# Patient Record
Sex: MALE | Race: WHITE | Employment: FULL TIME | URBAN - METROPOLITAN AREA
[De-identification: names, ages, dates, MRNs, and addresses within clinical notes are randomized per-mention and may not be internally consistent; named-entity substitution may affect disease eponyms.]

---

## 2018-03-12 ENCOUNTER — OFFICE VISIT (OUTPATIENT)
Dept: FAMILY MEDICINE CLINIC | Age: 33
End: 2018-03-12
Payer: COMMERCIAL

## 2018-03-12 ENCOUNTER — HOSPITAL ENCOUNTER (OUTPATIENT)
Dept: GENERAL RADIOLOGY | Age: 33
Discharge: HOME | End: 2018-03-14
Payer: COMMERCIAL

## 2018-03-12 ENCOUNTER — HOSPITAL ENCOUNTER (OUTPATIENT)
Age: 33
Discharge: HOME | End: 2018-03-14
Payer: COMMERCIAL

## 2018-03-12 VITALS
HEIGHT: 70 IN | SYSTOLIC BLOOD PRESSURE: 110 MMHG | HEART RATE: 74 BPM | BODY MASS INDEX: 23.77 KG/M2 | RESPIRATION RATE: 16 BRPM | DIASTOLIC BLOOD PRESSURE: 63 MMHG | WEIGHT: 166 LBS

## 2018-03-12 DIAGNOSIS — M79.672 LEFT FOOT PAIN: Primary | ICD-10-CM

## 2018-03-12 DIAGNOSIS — M79.672 LEFT FOOT PAIN: ICD-10-CM

## 2018-03-12 DIAGNOSIS — M79.671 RIGHT FOOT PAIN: Primary | ICD-10-CM

## 2018-03-12 PROCEDURE — G8427 DOCREV CUR MEDS BY ELIG CLIN: HCPCS | Performed by: FAMILY MEDICINE

## 2018-03-12 PROCEDURE — 73630 X-RAY EXAM OF FOOT: CPT

## 2018-03-12 PROCEDURE — 1036F TOBACCO NON-USER: CPT | Performed by: FAMILY MEDICINE

## 2018-03-12 PROCEDURE — G8484 FLU IMMUNIZE NO ADMIN: HCPCS | Performed by: FAMILY MEDICINE

## 2018-03-12 PROCEDURE — 99213 OFFICE O/P EST LOW 20 MIN: CPT | Performed by: FAMILY MEDICINE

## 2018-03-12 PROCEDURE — G8420 CALC BMI NORM PARAMETERS: HCPCS | Performed by: FAMILY MEDICINE

## 2018-03-12 RX ORDER — IBUPROFEN 800 MG/1
800 TABLET ORAL EVERY 8 HOURS PRN
Qty: 30 TABLET | Refills: 0 | Status: SHIPPED | OUTPATIENT
Start: 2018-03-12

## 2018-03-12 NOTE — PROGRESS NOTES
Subjective:  Neck is in for a sick call. He is complaining of foot pain. It is located in his left foot on the lateral aspect. It has been occurring for approximately 2 days. He has treated it with ibuprofen and had limited relief. He denies any injury to the area. He denies any constitutional symptoms such as fever, chills, sweats, or weight loss. Objective:  /63   Pulse 74   Resp 16   Ht 5' 10\" (1.778 m)   Wt 166 lb (75.3 kg)   BMI 23.82 kg/m²   Musculoskeletal: The exam is limited to the right foot. The fifth MTP joint is swollen and tender. There is no overlying erythema nor warmth. He has a full range of motion. Assessment:  1. right foot pain  ibuprofen (ADVIL;MOTRIN) 800 MG tablet    XR FOOT RIGHT (MIN 3 VIEWS)    CANCELED: XR FOOT LEFT (MIN 3 VIEWS)       Plan:  Medications, laboratory testing, imaging, consultation, and follow up as documented in this encounter. Further evaluation and management will be dependent upon x-ray results and response to medication.

## 2018-03-16 ENCOUNTER — HOSPITAL ENCOUNTER (OUTPATIENT)
Age: 33
Discharge: HOME | End: 2018-03-16
Payer: COMMERCIAL

## 2018-03-16 LAB — URIC ACID: 5.8 MG/DL (ref 3.4–7)

## 2018-03-16 PROCEDURE — 36415 COLL VENOUS BLD VENIPUNCTURE: CPT

## 2018-03-16 PROCEDURE — 84550 ASSAY OF BLOOD/URIC ACID: CPT

## 2018-11-12 ENCOUNTER — HOSPITAL ENCOUNTER (OUTPATIENT)
Age: 33
Discharge: HOME | End: 2018-11-14
Payer: COMMERCIAL

## 2018-11-12 ENCOUNTER — HOSPITAL ENCOUNTER (OUTPATIENT)
Dept: GENERAL RADIOLOGY | Age: 33
Discharge: HOME | End: 2018-11-14
Payer: COMMERCIAL

## 2018-11-12 ENCOUNTER — OFFICE VISIT (OUTPATIENT)
Dept: PRIMARY CARE CLINIC | Age: 33
End: 2018-11-12
Payer: COMMERCIAL

## 2018-11-12 VITALS
HEART RATE: 63 BPM | RESPIRATION RATE: 16 BRPM | DIASTOLIC BLOOD PRESSURE: 66 MMHG | OXYGEN SATURATION: 99 % | WEIGHT: 174.8 LBS | SYSTOLIC BLOOD PRESSURE: 118 MMHG | BODY MASS INDEX: 25.08 KG/M2

## 2018-11-12 DIAGNOSIS — S69.91XA INJURY OF RIGHT HAND, INITIAL ENCOUNTER: Primary | ICD-10-CM

## 2018-11-12 DIAGNOSIS — S69.91XA INJURY OF RIGHT HAND, INITIAL ENCOUNTER: ICD-10-CM

## 2018-11-12 PROCEDURE — 1036F TOBACCO NON-USER: CPT | Performed by: FAMILY MEDICINE

## 2018-11-12 PROCEDURE — 73130 X-RAY EXAM OF HAND: CPT

## 2018-11-12 PROCEDURE — G8484 FLU IMMUNIZE NO ADMIN: HCPCS | Performed by: FAMILY MEDICINE

## 2018-11-12 PROCEDURE — 99213 OFFICE O/P EST LOW 20 MIN: CPT | Performed by: FAMILY MEDICINE

## 2018-11-12 PROCEDURE — G8427 DOCREV CUR MEDS BY ELIG CLIN: HCPCS | Performed by: FAMILY MEDICINE

## 2018-11-12 PROCEDURE — G8419 CALC BMI OUT NRM PARAM NOF/U: HCPCS | Performed by: FAMILY MEDICINE

## 2018-11-12 ASSESSMENT — PATIENT HEALTH QUESTIONNAIRE - PHQ9
2. FEELING DOWN, DEPRESSED OR HOPELESS: 0
SUM OF ALL RESPONSES TO PHQ QUESTIONS 1-9: 0
SUM OF ALL RESPONSES TO PHQ9 QUESTIONS 1 & 2: 0
SUM OF ALL RESPONSES TO PHQ QUESTIONS 1-9: 0
1. LITTLE INTEREST OR PLEASURE IN DOING THINGS: 0

## 2018-11-12 NOTE — PATIENT INSTRUCTIONS
Patient Education        Hand Pain: Care Instructions  Your Care Instructions    Common causes of hand pain are overuse and injuries, such as might happen during sports or home repair projects. Everyday wear and tear, especially as you get older, also can cause hand pain. Most minor hand injuries will heal on their own, and home treatment is usually all you need to do. If you have sudden and severe pain, you may need tests and treatment. Follow-up care is a key part of your treatment and safety. Be sure to make and go to all appointments, and call your doctor if you are having problems. It's also a good idea to know your test results and keep a list of the medicines you take. How can you care for yourself at home? · Take pain medicines exactly as directed. ? If the doctor gave you a prescription medicine for pain, take it as prescribed. ? If you are not taking a prescription pain medicine, ask your doctor if you can take an over-the-counter medicine. · Rest and protect your hand. Take a break from any activity that may cause pain. · Put ice or a cold pack on your hand for 10 to 20 minutes at a time. Put a thin cloth between the ice and your skin. · Prop up the sore hand on a pillow when you ice it or anytime you sit or lie down during the next 3 days. Try to keep it above the level of your heart. This will help reduce swelling. · If your doctor recommends a sling, splint, or elastic bandage to support your hand, wear it as directed. When should you call for help? Call 911 anytime you think you may need emergency care. For example, call if:    · Your hand turns cool or pale or changes color.    Call your doctor now or seek immediate medical care if:    · You cannot move your hand.     · Your hand pops, moves out of its normal position, and then returns to its normal position.     · You have signs of infection, such as:  ? Increased pain, swelling, warmth, or redness.   ? Red streaks leading from the sore

## 2018-11-15 ENCOUNTER — HOSPITAL ENCOUNTER (OUTPATIENT)
Dept: OCCUPATIONAL THERAPY | Facility: CLINIC | Age: 33
Setting detail: THERAPIES SERIES
Discharge: HOME | End: 2018-11-15
Payer: COMMERCIAL

## 2018-11-15 PROCEDURE — 97165 OT EVAL LOW COMPLEX 30 MIN: CPT

## 2018-11-15 PROCEDURE — 97110 THERAPEUTIC EXERCISES: CPT

## 2018-11-15 NOTE — CONSULTS
[] [address] [facility] floor       [address], [place]         Phone: [phone]       Fax: [phone] [x] 6134 [facility] at [address] , [address]  Phone: [phone]  Fax: [phone]       Occupational Therapy Hand & Upper Extremity  Initial Evaluation      Date: 11/15/2018      Patient: Linda Croft  : 1985  MRN: [id number]    Physician: Salvador Magallon MD    Insurance: MMO   40 visits max/40 available     Medical Diagnosis: Injury of the right hand S69.91XA. Rehab Codes: Edema, localized R60.0; pain in right finger M79.644; muscle atrophy, right hand M62.541. Onset Date: 18    Next Dr. Manny Dumas: 19  1:00 PM  #Visits/Total Visits:     2 times a week for 8 visits      Cancels/No Shows:  0/0    Past Medical History: [ X ] Unremarkable  [  ] MI/Heart Problems  [  ] Refer to full medical chart in EPIC  [  ] Diabetes  [  ] Martene Crown  [  ] HTN  [  ] Arthritis  [  ] Pacemaker  [  ] Other:  Medications:  [  ] Refer to full medical chart in EPIC  [  ] None  [ X ] Other: Advil prn. Allergies:  [  ] Refer to full medical chart in EPIC  [  ] None  [ X ] Other: Liquid Tylenol, Amocycillin. Mechanism of Injury: Right index vs hand tool  Surgery Date: NA    Precautions:   [x]None [] Fall Risk []WB Status [] Pacemaker []Other:            Involved Extremity:      [] Left [x] Right  Dominant: [] Left [x]Right  Previous Level of Function: Globally independent  Critical Job/Daily Task Description: Self care, household tasks, parenting tasks, typing, computer and phone use.   Work Status: [x] Normal [] Restricted [] Off D/T Injury/Condition [] Retired [] Unemployed [] Disabled []Other:  Orthosis:  NA   [] Currently has [] To be custom fabricated this date []Planned for subsequent visit  Type:      Subjective:  Chief Complaint: Pain   Pain: Intensity:   3/10 Location: Right index   Pain Type: [x] Constant []

## 2018-11-19 ENCOUNTER — HOSPITAL ENCOUNTER (OUTPATIENT)
Dept: OCCUPATIONAL THERAPY | Facility: CLINIC | Age: 33
Setting detail: THERAPIES SERIES
Discharge: HOME | End: 2018-11-19
Payer: COMMERCIAL

## 2018-11-19 PROCEDURE — 97110 THERAPEUTIC EXERCISES: CPT

## 2018-11-19 PROCEDURE — 97035 APP MDLTY 1+ULTRASOUND EA 15: CPT

## 2018-11-28 ENCOUNTER — HOSPITAL ENCOUNTER (OUTPATIENT)
Dept: OCCUPATIONAL THERAPY | Facility: CLINIC | Age: 33
Setting detail: THERAPIES SERIES
Discharge: HOME | End: 2018-11-28
Payer: COMMERCIAL

## 2018-11-28 PROCEDURE — 97110 THERAPEUTIC EXERCISES: CPT

## 2018-11-28 PROCEDURE — 97035 APP MDLTY 1+ULTRASOUND EA 15: CPT

## 2018-11-28 NOTE — FLOWSHEET NOTE
[] [place]       Occupational Therapy             1st floor       [address] [place], [place]         Phone: [phone]       Fax: [phone] [x] 6135 [facility] at            [address] , [address] [place] [address]     Phone: [phone]     Fax: [phone]      Occupational Therapy Daily Treatment Note    Date:  2018  Patient Name:  Linda Croft    :  1985  MRN: [id number]  Physician: Salvador Magallon MD                   Insurance: MMO   40 visits max/40 available                Medical Diagnosis: Injury of the right hand S69.91XA. Rehab Codes: Edema, localized R60.0; pain in right finger M79.644; muscle atrophy, right hand M62.541.     Onset Date: 18               Next Dr. Manny Dumas: 19  1:00 PM  #Visits/Total Visits:     2 times a week for 8 visits      Cancels/No Shows:  0/0     Subjective:    Pain:  [x] Yes  [] No Location: Right index MCP joint   Pain Rating: (0-10 scale) 0/10 at rest, 2-3/10 with resistive movement  Pain altered Tx:  [x] No  [] Yes  Action: NA  Pt Comments: NA    Objective:  Modalities:   Modality Flow Sheet:  START STOP Tx Modality       Electrical Stim:      11/15/18   Ultrasound: 0.8 W/cm2 x 6-8 mins  Duty factor: __100%  __50%  __33% __20%  Head size: 2 cm  MHz: __1mHz  __3mHz  Location: dorsum of right index MCP joint       Hot Pack:       Cold Pack:                                 Flow Sheet:  Exercise Reps/Time Weight/Level Comments   Tendon glides 17 reps   Increased reps. Ultrasound, see parameters above     Administered   Resistive , two- and three point pinches and ext with Putty  20 reps  Yellow  Completed. No putty issued for HEP due to small children at home.  (Pt will use \"Play Do\" for two point pinch ex. (Lateral pinch ex on hold at this time).    Small Pinch Maze  As tolerated   Increased distance   Resistive  with hand exercisor 27 reps  15 pounds  Completed    Grasp and release with particle bin  corn Added    Resistive pinch with pinch pin and foam cubes 1 handful  2 pounds  Added         Comments/Assessment:  Ex's added with no increase in pain (2-3/10). No edema observed at right index MCP joint this date. Pt reports pain with radial/ulnar resistive movement at the MCP joint. Specific Instructions for Next Treatment:    Treatment Charges: Mins Units   [x]  Modalities:  Ultrasound   8 1   [x]  Ther Exercise 17 1   []  Manual Therapy     []  Ther Activities     []  Orthotic fitting/training     []  Orthotic re-check     []  Other         Assessment: [x] Progressing toward goals. [] No change. [] Other:    Short Term Goals: (   8    Treatments)  1. Decrease Pain:  Pain will be 0/10 at rest and with activity. 2. Increase strength (pounds): Right  strength will be symmetrical with the left. pinches will be symmetrical or greater than the Rt. 3. Increase function:  DASH score will be 2% functionally impaired or less. 4. Decrease Edema: Circumfirential measurements will be symmetrical, Rt vs Lt. 5. Independent with Home Exercise Program in 2 sessions.        Long Term Goals: NA       Pt. Education:  [] Yes  [] No  [x] Reviewed Prior HEP/Ed  Method of Education: [x] Verbal  [] Demo  [] Written  Re:  Comprehension of Education:  [] Verbalizes understanding. [] Demonstrates understanding. [] Needs review. [x] Demonstrates/verbalizes HEP/Ed previously given. Treatment Plan: 2 times a week for 8 visits for pain management and strengthening of right index finger. Time In/Out: 1101 - 1126  Total Treatment Time:  25  Min.       Electronically signed by:  Neo Freedman OTR/JANENE, OTF

## 2018-11-29 ENCOUNTER — HOSPITAL ENCOUNTER (OUTPATIENT)
Dept: OCCUPATIONAL THERAPY | Facility: CLINIC | Age: 33
Setting detail: THERAPIES SERIES
Discharge: HOME | End: 2018-11-29
Payer: COMMERCIAL

## 2018-11-29 PROCEDURE — 97110 THERAPEUTIC EXERCISES: CPT

## 2018-11-29 NOTE — FLOWSHEET NOTE
[] [facility]       Occupational Therapy             1st floor       [address] [place], [place]         Phone: [phone]       Fax: [phone] [x] [address] at            [address] , [address] [place] [address]     Phone: [phone]     Fax: [phone]      Occupational Therapy Daily Treatment Note    Date:  2018  Patient Name:  Amberly Harrington    :  1985  MRN: [id number]  Physician: Concetta Melchor MD                   Insurance: MMO   40 visits max/40 available                Medical Diagnosis: Injury of the right hand S69.91XA. Rehab Codes: Edema, localized R60.0; pain in right finger M79.644; muscle atrophy, right hand M62.541.     Onset Date: 18               Next Dr. Lenin Moe: 19  1:00 PM  #Visits/Total Visits: /8    2 times a week for 8 visits      Cancels/No Shows:  0/0     Subjective:    Pain:  [x] Yes  [] No Location: Right index MCP joint   Pain Rating: (0-10 scale) 0/10 at rest, 2/10 with resistive movement  Pain altered Tx:  [x] No  [] Yes  Action: NA  Pt Comments: NA    Objective:  Modalities:   Modality Flow Sheet:  START STOP Tx Modality       Electrical Stim:      11/15/18   Ultrasound: 0.8 W/cm2 x 6-8 mins  Duty factor: __100%  __50%  __33% __20%  Head size: 2 cm  MHz: __1mHz  __3mHz  Location: dorsum of right index MCP joint       Hot Pack:       Cold Pack:                                 Flow Sheet:  Exercise Reps/Time Weight/Level Comments   Tendon glides 20 reps   Increased reps. Ultrasound, see parameters above     Administered   Resistive , two- and three point pinches and ext with Putty  20 reps  Yellow  Completed. No putty issued for HEP due to small children at home.  (Pt will use \"Play Do\" for two point pinch ex. (Lateral pinch ex on hold at this time).    Small Pinch Maze  As tolerated   Increased distance   Resistive  with hand exercisor 7 reps  15 pounds  Decreased reps d/t pain   Grasp and release

## 2018-12-03 ENCOUNTER — HOSPITAL ENCOUNTER (OUTPATIENT)
Dept: OCCUPATIONAL THERAPY | Facility: CLINIC | Age: 33
Setting detail: THERAPIES SERIES
Discharge: HOME | End: 2018-12-03
Payer: COMMERCIAL

## 2018-12-03 PROCEDURE — 97035 APP MDLTY 1+ULTRASOUND EA 15: CPT

## 2018-12-03 PROCEDURE — 97110 THERAPEUTIC EXERCISES: CPT

## 2018-12-03 NOTE — FLOWSHEET NOTE
Resistive pinch with pinch pin and foam cubes 2 handfuls 2-4 pounds  Increased resistance and reps        Comments/Assessment:  Pain at start of visit 1-2/10. Ex's as outlined above, with no increase in pain with putty ex, reduced to 1/10 at end of visit. Pt reports pain with radial/ulnar resistive movement at the MCP joint, mostly in 2nd web space. Brenden Gomez Specific Instructions for Next Treatment:    Treatment Charges: Mins Units   [x]  Modalities:  Ultrasound   8 1   [x]  Ther Exercise 20 1   []  Manual Therapy     []  Ther Activities     []  Orthotic fitting/training     []  Orthotic re-check     []  Other         Assessment: [x] Progressing toward goals. [] No change. [] Other:    Short Term Goals: (   8    Treatments)  1. Decrease Pain:  Pain will be 0/10 at rest and with activity. 2. Increase strength (pounds): Right  strength will be symmetrical with the left. pinches will be symmetrical or greater than the Rt. 3. Increase function:  DASH score will be 2% functionally impaired or less. 4. Decrease Edema: Circumfirential measurements will be symmetrical, Rt vs Lt. 5. Independent with Home Exercise Program in 2 sessions.        Long Term Goals: NA       Pt. Education:  [] Yes  [] No  [x] Reviewed Prior HEP/Ed  Method of Education: [x] Verbal  [] Demo  [] Written  Re:  Comprehension of Education:  [] Verbalizes understanding. [] Demonstrates understanding. [] Needs review. [x] Demonstrates/verbalizes HEP/Ed previously given. Treatment Plan: 2 times a week for 8 visits for pain management and strengthening of right index finger. Time In/Out: 9478 - 5187  Total Treatment Time:  28  Min.       Electronically signed by:  Neo Freedman OTR/L, CHARELI

## 2018-12-05 ENCOUNTER — HOSPITAL ENCOUNTER (OUTPATIENT)
Dept: OCCUPATIONAL THERAPY | Facility: CLINIC | Age: 33
Setting detail: THERAPIES SERIES
Discharge: HOME | End: 2018-12-05
Payer: COMMERCIAL

## 2018-12-05 PROCEDURE — 97110 THERAPEUTIC EXERCISES: CPT

## 2018-12-13 ENCOUNTER — HOSPITAL ENCOUNTER (OUTPATIENT)
Dept: OCCUPATIONAL THERAPY | Facility: CLINIC | Age: 33
Setting detail: THERAPIES SERIES
Discharge: HOME | End: 2018-12-13
Payer: COMMERCIAL

## 2018-12-13 PROCEDURE — 97110 THERAPEUTIC EXERCISES: CPT

## 2018-12-13 PROCEDURE — 97035 APP MDLTY 1+ULTRASOUND EA 15: CPT

## 2018-12-13 NOTE — PROGRESS NOTES
[] [address] [facility] floor       [address], [place]         Phone: [phone]       Fax: [phone] [x] 6135 [facility] at [facility] [address] [place] [address] , [address]  Phone: [phone]  Fax: [phone]       Occupational Therapy Hand & Upper Extremity  Progress Note      Date: 2018      Patient: Tosha Coreas  : 1985  MRN: [id number]    Physician: Marce Enriquez MD    Insurance: MMO   40 visits max/40 available     Medical Diagnosis: Injury of the right hand S69.91XA. Rehab Codes: Edema, localized R60.0; pain in right finger M79.644; muscle atrophy, right hand M62.541. Onset Date: 18    Next Dr. Benavides Max: 19  1:00 PM  #Visits/Total Visits: 7/8    2 times a week for 8 visits      Cancels/No Shows:  0/0    Past Medical History: [ X ] Unremarkable  [  ] MI/Heart Problems  [  ] Refer to full medical chart in EPIC  [  ] Diabetes  [  ] Piyush Alderman  [  ] HTN  [  ] Arthritis  [  ] Pacemaker  [  ] Other:  Medications:  [  ] Refer to full medical chart in EPIC  [  ] None  [ X ] Other: Advil prn. Allergies:  [  ] Refer to full medical chart in EPIC  [  ] None  [ X ] Other: Liquid Tylenol, Amocycillin. Mechanism of Injury: Right index vs hand tool  Surgery Date: NA    Precautions:   [x]None [] Fall Risk []WB Status [] Pacemaker []Other:            Involved Extremity:      [] Left [x] Right  Dominant: [] Left [x]Right  Previous Level of Function: Globally independent  Critical Job/Daily Task Description: Self care, household tasks, parenting tasks, typing, computer and phone use.   Work Status: [x] Normal [] Restricted [] Off D/T Injury/Condition [] Retired [] Unemployed [] Disabled []Other:  Orthosis:  NA   [] Currently has [] To be custom fabricated this date []Planned for subsequent visit  Type:      Subjective:  Chief Complaint: Pain   Pain: Intensity:   1-2/10 Location: Right index   Pain Type: [x] Constant [] Maze  As tolerated   Not Completed   Resistive  with hand exercisor 28 reps  15 pounds  Increased reps    Grasp and release with particle bin 1/3 series Corn Not Completed    Resistive pinch with pinch pin and foam cubes 2 handfuls 2-4 pounds  Completed         Treatment Charges: Mins Units   [x]  Modalities:  Ultrasound   6 0   [x]  Ther Exercise 32 2   []  Manual Therapy       []  Ther Activities       []  Orthotic fitting/training       []  Orthotic re-check       []  Other            Total Treatment Time:  45  Min.      Time In/Time Out:  1315 - 4386       Electronically signed by ONEIDA Gamino/L, CHT on 12/13/2018 at 1:28 PM

## 2018-12-17 ENCOUNTER — HOSPITAL ENCOUNTER (OUTPATIENT)
Dept: OCCUPATIONAL THERAPY | Facility: CLINIC | Age: 33
Setting detail: THERAPIES SERIES
Discharge: HOME | End: 2018-12-17
Payer: COMMERCIAL

## 2018-12-17 PROCEDURE — 97035 APP MDLTY 1+ULTRASOUND EA 15: CPT

## 2018-12-17 PROCEDURE — 97110 THERAPEUTIC EXERCISES: CPT

## 2018-12-17 NOTE — DISCHARGE SUMMARY
Right index   Pain Type: [x] Constant [] Intermittent   [  ] with pain meds at rest   [] With movement/Resistive activity [] With Sedentary activity    Pain Altered Tx: []Yes [x]No  Action Taken: NA    Objective:  Tests/Measurements:  Current Functional Level:  10% functionally impaired as measured with the DASH Functional Survey. 0-100 scale, with 0 = no Deficits  Initial Functional Level:  10% functionally impaired as measured with the DASH Functional Survey. A decrease of 15 or more points is considered to be a significant improvement in functional abilities and symptoms. This individual score is unchanged from the previous measurement. DIGITS   Current  12/13/18      Extension/Flexion  AROM   Degrees Right INDEX   MCP 17/88    WNL   PIP -5/107   Mild ext lag/WNL   DIP  -7/80    Mild ext lag/WNL           DIGITS   Comparison, Ofbxjli23/15/18      Extension/Flexion  AROM   Degrees Right INDEX   MCP +5/80    WNL   PIP +5/104  WNL   DIP   0/71    WNL             STRENGTH   Current 12/13/18              Pounds RIGHT LEFT    53.3 55.6   Lateral pinch 17 17   2 point pinch 11  With pain 10   3 jaw pinch 14  With pain 15   The right  is 4.2% weaker than the unaffected non-dominant left . STRENGTH   Comparison, Initial 11/15/18              Pounds RIGHT LEFT    50. 54.3   Lateral pinch 13 18   2 point pinch   8  With pain   9   3 jaw pinch 14  With pain 12   The right  is 8% weaker than the unaffected non-dominant left . Edema:  Circumfirential measurements of the index fingers as follows:  P1;  Right 6.5 cm, Left 6.5  cm  P2:  Right 5.5 cm, Left 5.2  cm  P3:  Right 4.8 cm, Left 4.7  cm  A variance of less than 0.5cm indicated mild edema of the finger.     Problems:     [x] Pain       [] ROM      [x] Strength  [x] Function     [] Adherent Scar    [x] Edema     [] Open Wound    [] Coordination    [] Sensation     [] Falls: History/Risk of   []          Short Term Goals: (   8

## 2019-05-30 ENCOUNTER — HOSPITAL ENCOUNTER (OUTPATIENT)
Dept: ULTRASOUND IMAGING | Age: 34
Discharge: HOME | End: 2019-06-01
Payer: COMMERCIAL

## 2019-05-30 DIAGNOSIS — N50.82 SCROTAL PAIN: ICD-10-CM

## 2019-05-30 DIAGNOSIS — R10.31 RT GROIN PAIN: ICD-10-CM

## 2019-05-30 DIAGNOSIS — K40.90 NON-RECURRENT UNILATERAL INGUINAL HERNIA WITHOUT OBSTRUCTION OR GANGRENE: ICD-10-CM

## 2019-05-30 PROCEDURE — 76870 US EXAM SCROTUM: CPT

## 2019-06-07 ENCOUNTER — OFFICE VISIT (OUTPATIENT)
Dept: PRIMARY CARE CLINIC | Age: 34
End: 2019-06-07
Payer: COMMERCIAL

## 2019-06-07 VITALS
HEART RATE: 90 BPM | WEIGHT: 176 LBS | BODY MASS INDEX: 25.2 KG/M2 | SYSTOLIC BLOOD PRESSURE: 122 MMHG | HEIGHT: 70 IN | OXYGEN SATURATION: 98 % | DIASTOLIC BLOOD PRESSURE: 78 MMHG | RESPIRATION RATE: 16 BRPM

## 2019-06-07 DIAGNOSIS — R10.31 RIGHT INGUINAL PAIN: ICD-10-CM

## 2019-06-07 DIAGNOSIS — N43.3 HYDROCELE, UNSPECIFIED HYDROCELE TYPE: Primary | ICD-10-CM

## 2019-06-07 PROCEDURE — 1036F TOBACCO NON-USER: CPT | Performed by: FAMILY MEDICINE

## 2019-06-07 PROCEDURE — G8419 CALC BMI OUT NRM PARAM NOF/U: HCPCS | Performed by: FAMILY MEDICINE

## 2019-06-07 PROCEDURE — G8427 DOCREV CUR MEDS BY ELIG CLIN: HCPCS | Performed by: FAMILY MEDICINE

## 2019-06-07 PROCEDURE — 99213 OFFICE O/P EST LOW 20 MIN: CPT | Performed by: FAMILY MEDICINE

## 2019-06-07 NOTE — PATIENT INSTRUCTIONS
Patient Education        Groin Strain: Care Instructions  Your Care Instructions  A groin strain is an injury that happens when you tear or overstretch (pull) a groin muscle. The groin muscles are in the area on either side of the body in the folds where the belly joins the legs. You can strain a groin muscle during exercise, such as running, skating, kicking in soccer, or playing basketball. It can happen when you lift, push, or pull heavy objects. You might pull a groin muscle when you fall. The injury can range from a minor pull to a more serious tear of the muscle. You may feel pain and tenderness that's worse when you squeeze your legs together. You may also have pain when you raise the knee of the injured side. There may be swelling or bruising in the groin area or inner thigh. If you have a bad strain, you may walk with a limp while it heals. Rest and other home care can help the muscle heal. Healing can take up to 3 weeks or more. Your doctor may want to see you again in 2 to 3 weeks. Follow-up care is a key part of your treatment and safety. Be sure to make and go to all appointments, and call your doctor if you are having problems. It's also a good idea to know your test results and keep a list of the medicines you take. How can you care for yourself at home? · Be safe with medicines. Read and follow all instructions on the label. ? If the doctor gave you a prescription medicine for pain, take it as prescribed. ? If you are not taking a prescription pain medicine, ask your doctor if you can take an over-the-counter medicine. · Rest and protect your injured or sore groin area for 1 to 2 weeks. Stop, change, or take a break from any activity that may be causing your pain or soreness. Do not do intense activities while you still have pain. · Put ice or a cold pack on your groin area for 10 to 20 minutes at a time.  Try to do this every 1 to 2 hours for the next 3 days (when you are awake) or until the swelling goes down. Put a thin cloth between the ice and your skin. · After 2 or 3 days, if your swelling is gone, apply heat. Put a warm water bottle, a heating pad set on low, or a warm cloth on your groin area. Do not go to sleep with a heating pad on your skin. · If your doctor gave you crutches, make sure you use them as directed. · Wear snug shorts or underwear that support the injured area. When should you call for help? Call your doctor now or seek immediate medical care if:    · You have new or severe pain or swelling in the groin area.     · Your groin or upper thigh is cool or pale or changes color.     · You have tingling, weakness, or numbness in your groin or leg.     · You cannot move your leg.     · You cannot put weight on your leg.    Watch closely for changes in your health, and be sure to contact your doctor if:    · You do not get better as expected. Where can you learn more? Go to https://[employer]. org and sign in to your [employer] account. Enter B169 in the [employer] box to learn more about \"Groin Strain: Care Instructions. \"     If you do not have an account, please click on the \"Sign Up Now\" link. Current as of: September 20, 2018  Content Version: 12.0  © 9231-5393 Healthwise, Incorporated. Care instructions adapted under license by [facility] ([facility]). If you have questions about a medical condition or this instruction, always ask your healthcare professional. [place] [address] any warranty or liability for your use of this information.

## 2019-06-07 NOTE — PROGRESS NOTES
Subjective:  Livia Giang is in for continued evaluation and management. He was recently seen in urgent care. He was experiencing groin pain on the right side after lifting. He had a scrotal ultrasound which demonstrated a hydrocele. He continues to have some discomfort in the area. He's had no formal evaluation, other than the urgent care visit, thus far. Objective:  /78 (Site: Left Upper Arm, Position: Sitting, Cuff Size: Medium Adult)   Pulse 90   Resp 16   Ht 5' 10\" (1.778 m)   Wt 176 lb (79.8 kg)   SpO2 98%   BMI 25.25 kg/m²      Abdomen and scrotum: The abdomen is soft, nontender, nondistended, with no rebound nor rigidity nor guarding. The inguinal canal is without palpable mass but notably tender. Scrotal ultrasound dated 5/30/19 reviewed and discussed with the patient. Assessment:   Diagnosis Orders   1. Hydrocele, unspecified hydrocele type     2. Right inguinal pain  LOLA Lopez MD, General Surgery, [place]       Plan:  Medications, laboratory testing, imaging, consultation, and follow up as documented in this encounter. I suspect the hydrocele is an incidental finding. He may have a hernia, but I was unable to detected on my clinical exam.  I have recommended evaluation by general surgery and provided him with some information regarding a strained groin.